# Patient Record
Sex: FEMALE | Race: WHITE | NOT HISPANIC OR LATINO | Employment: UNEMPLOYED | URBAN - NONMETROPOLITAN AREA
[De-identification: names, ages, dates, MRNs, and addresses within clinical notes are randomized per-mention and may not be internally consistent; named-entity substitution may affect disease eponyms.]

---

## 2021-07-06 ENCOUNTER — HOSPITAL ENCOUNTER (EMERGENCY)
Facility: HOSPITAL | Age: 36
Discharge: PSYCHIATRIC OR SUBSTANCE USE FACILITY | End: 2021-07-07
Attending: STUDENT IN AN ORGANIZED HEALTH CARE EDUCATION/TRAINING PROGRAM | Admitting: EMERGENCY MEDICINE

## 2021-07-06 ENCOUNTER — APPOINTMENT (OUTPATIENT)
Dept: GENERAL RADIOLOGY | Facility: HOSPITAL | Age: 36
End: 2021-07-06

## 2021-07-06 DIAGNOSIS — F10.10 ALCOHOL ABUSE: Primary | ICD-10-CM

## 2021-07-06 LAB
6-ACETYL MORPHINE: NEGATIVE
ALBUMIN SERPL-MCNC: 5 G/DL (ref 3.5–5.2)
ALBUMIN/GLOB SERPL: 1.6 G/DL
ALP SERPL-CCNC: 122 U/L (ref 39–117)
ALT SERPL W P-5'-P-CCNC: 12 U/L (ref 1–33)
AMPHET+METHAMPHET UR QL: NEGATIVE
ANION GAP SERPL CALCULATED.3IONS-SCNC: 23.5 MMOL/L (ref 5–15)
AST SERPL-CCNC: 39 U/L (ref 1–32)
B-HCG UR QL: NEGATIVE
BACTERIA UR QL AUTO: ABNORMAL /HPF
BARBITURATES UR QL SCN: NEGATIVE
BASOPHILS # BLD AUTO: 0.09 10*3/MM3 (ref 0–0.2)
BASOPHILS NFR BLD AUTO: 1.2 % (ref 0–1.5)
BENZODIAZ UR QL SCN: NEGATIVE
BILIRUB SERPL-MCNC: 0.5 MG/DL (ref 0–1.2)
BILIRUB UR QL STRIP: NEGATIVE
BUN SERPL-MCNC: 12 MG/DL (ref 6–20)
BUN/CREAT SERPL: 13 (ref 7–25)
BUPRENORPHINE SERPL-MCNC: NEGATIVE NG/ML
C TRACH RRNA CVX QL NAA+PROBE: NOT DETECTED
CALCIUM SPEC-SCNC: 9.1 MG/DL (ref 8.6–10.5)
CANNABINOIDS SERPL QL: NEGATIVE
CHLORIDE SERPL-SCNC: 96 MMOL/L (ref 98–107)
CLARITY UR: ABNORMAL
CO2 SERPL-SCNC: 20.5 MMOL/L (ref 22–29)
COCAINE UR QL: NEGATIVE
COLOR UR: YELLOW
CREAT SERPL-MCNC: 0.92 MG/DL (ref 0.57–1)
CRP SERPL-MCNC: <0.3 MG/DL (ref 0–0.5)
D-LACTATE SERPL-SCNC: 3.4 MMOL/L (ref 0.5–2)
D-LACTATE SERPL-SCNC: 5 MMOL/L (ref 0.5–2)
DEPRECATED RDW RBC AUTO: 46.8 FL (ref 37–54)
EOSINOPHIL # BLD AUTO: 0.52 10*3/MM3 (ref 0–0.4)
EOSINOPHIL NFR BLD AUTO: 7 % (ref 0.3–6.2)
ERYTHROCYTE [DISTWIDTH] IN BLOOD BY AUTOMATED COUNT: 15.2 % (ref 12.3–15.4)
ETHANOL BLD-MCNC: 135 MG/DL (ref 0–10)
ETHANOL BLD-MCNC: 223 MG/DL (ref 0–10)
ETHANOL UR QL: 0.14 %
ETHANOL UR QL: 0.22 %
FLUAV RNA RESP QL NAA+PROBE: NOT DETECTED
FLUBV RNA RESP QL NAA+PROBE: NOT DETECTED
GFR SERPL CREATININE-BSD FRML MDRD: 70 ML/MIN/1.73
GLOBULIN UR ELPH-MCNC: 3.2 GM/DL
GLUCOSE SERPL-MCNC: 87 MG/DL (ref 65–99)
GLUCOSE UR STRIP-MCNC: NEGATIVE MG/DL
HCT VFR BLD AUTO: 40.2 % (ref 34–46.6)
HGB BLD-MCNC: 13.2 G/DL (ref 12–15.9)
HGB UR QL STRIP.AUTO: ABNORMAL
HOLD SPECIMEN: NORMAL
HOLD SPECIMEN: NORMAL
HYALINE CASTS UR QL AUTO: ABNORMAL /LPF
IMM GRANULOCYTES # BLD AUTO: 0.01 10*3/MM3 (ref 0–0.05)
IMM GRANULOCYTES NFR BLD AUTO: 0.1 % (ref 0–0.5)
KETONES UR QL STRIP: ABNORMAL
LEUKOCYTE ESTERASE UR QL STRIP.AUTO: ABNORMAL
LYMPHOCYTES # BLD AUTO: 1.87 10*3/MM3 (ref 0.7–3.1)
LYMPHOCYTES NFR BLD AUTO: 25.2 % (ref 19.6–45.3)
MAGNESIUM SERPL-MCNC: 2.1 MG/DL (ref 1.6–2.6)
MCH RBC QN AUTO: 27.6 PG (ref 26.6–33)
MCHC RBC AUTO-ENTMCNC: 32.8 G/DL (ref 31.5–35.7)
MCV RBC AUTO: 84.1 FL (ref 79–97)
METHADONE UR QL SCN: NEGATIVE
MONOCYTES # BLD AUTO: 0.42 10*3/MM3 (ref 0.1–0.9)
MONOCYTES NFR BLD AUTO: 5.7 % (ref 5–12)
N GONORRHOEA RRNA SPEC QL NAA+PROBE: NOT DETECTED
NEUTROPHILS NFR BLD AUTO: 4.51 10*3/MM3 (ref 1.7–7)
NEUTROPHILS NFR BLD AUTO: 60.8 % (ref 42.7–76)
NITRITE UR QL STRIP: NEGATIVE
NRBC BLD AUTO-RTO: 0 /100 WBC (ref 0–0.2)
OPIATES UR QL: NEGATIVE
OXYCODONE UR QL SCN: NEGATIVE
PCP UR QL SCN: NEGATIVE
PH UR STRIP.AUTO: <=5 [PH] (ref 5–8)
PLATELET # BLD AUTO: 273 10*3/MM3 (ref 140–450)
PMV BLD AUTO: 9.6 FL (ref 6–12)
POTASSIUM SERPL-SCNC: 4.2 MMOL/L (ref 3.5–5.2)
PROT SERPL-MCNC: 8.2 G/DL (ref 6–8.5)
PROT UR QL STRIP: ABNORMAL
RBC # BLD AUTO: 4.78 10*6/MM3 (ref 3.77–5.28)
RBC # UR: ABNORMAL /HPF
REF LAB TEST METHOD: ABNORMAL
SARS-COV-2 RNA RESP QL NAA+PROBE: NOT DETECTED
SODIUM SERPL-SCNC: 140 MMOL/L (ref 136–145)
SP GR UR STRIP: 1.02 (ref 1–1.03)
SQUAMOUS #/AREA URNS HPF: ABNORMAL /HPF
UROBILINOGEN UR QL STRIP: ABNORMAL
WBC # BLD AUTO: 7.42 10*3/MM3 (ref 3.4–10.8)
WBC UR QL AUTO: ABNORMAL /HPF
WHOLE BLOOD HOLD SPECIMEN: NORMAL

## 2021-07-06 PROCEDURE — 87636 SARSCOV2 & INF A&B AMP PRB: CPT | Performed by: PHYSICIAN ASSISTANT

## 2021-07-06 PROCEDURE — 99285 EMERGENCY DEPT VISIT HI MDM: CPT

## 2021-07-06 PROCEDURE — 63710000001 ONDANSETRON ODT 4 MG TABLET DISPERSIBLE: Performed by: PHYSICIAN ASSISTANT

## 2021-07-06 PROCEDURE — 80307 DRUG TEST PRSMV CHEM ANLYZR: CPT | Performed by: PHYSICIAN ASSISTANT

## 2021-07-06 PROCEDURE — 96375 TX/PRO/DX INJ NEW DRUG ADDON: CPT

## 2021-07-06 PROCEDURE — 82077 ASSAY SPEC XCP UR&BREATH IA: CPT | Performed by: GENERAL ACUTE CARE HOSPITAL

## 2021-07-06 PROCEDURE — 83735 ASSAY OF MAGNESIUM: CPT | Performed by: PHYSICIAN ASSISTANT

## 2021-07-06 PROCEDURE — 86140 C-REACTIVE PROTEIN: CPT | Performed by: PHYSICIAN ASSISTANT

## 2021-07-06 PROCEDURE — 85025 COMPLETE CBC W/AUTO DIFF WBC: CPT | Performed by: PHYSICIAN ASSISTANT

## 2021-07-06 PROCEDURE — 87491 CHLMYD TRACH DNA AMP PROBE: CPT | Performed by: PHYSICIAN ASSISTANT

## 2021-07-06 PROCEDURE — 81025 URINE PREGNANCY TEST: CPT | Performed by: PHYSICIAN ASSISTANT

## 2021-07-06 PROCEDURE — 71045 X-RAY EXAM CHEST 1 VIEW: CPT | Performed by: RADIOLOGY

## 2021-07-06 PROCEDURE — 36415 COLL VENOUS BLD VENIPUNCTURE: CPT

## 2021-07-06 PROCEDURE — 87591 N.GONORRHOEAE DNA AMP PROB: CPT | Performed by: PHYSICIAN ASSISTANT

## 2021-07-06 PROCEDURE — 71045 X-RAY EXAM CHEST 1 VIEW: CPT

## 2021-07-06 PROCEDURE — 96368 THER/DIAG CONCURRENT INF: CPT

## 2021-07-06 PROCEDURE — 81001 URINALYSIS AUTO W/SCOPE: CPT | Performed by: PHYSICIAN ASSISTANT

## 2021-07-06 PROCEDURE — C9803 HOPD COVID-19 SPEC COLLECT: HCPCS | Performed by: PHYSICIAN ASSISTANT

## 2021-07-06 PROCEDURE — 87086 URINE CULTURE/COLONY COUNT: CPT | Performed by: PHYSICIAN ASSISTANT

## 2021-07-06 PROCEDURE — 82077 ASSAY SPEC XCP UR&BREATH IA: CPT | Performed by: PHYSICIAN ASSISTANT

## 2021-07-06 PROCEDURE — 96365 THER/PROPH/DIAG IV INF INIT: CPT

## 2021-07-06 PROCEDURE — 25010000002 CEFTRIAXONE PER 250 MG: Performed by: PHYSICIAN ASSISTANT

## 2021-07-06 PROCEDURE — 87040 BLOOD CULTURE FOR BACTERIA: CPT | Performed by: PHYSICIAN ASSISTANT

## 2021-07-06 PROCEDURE — 80053 COMPREHEN METABOLIC PANEL: CPT | Performed by: PHYSICIAN ASSISTANT

## 2021-07-06 PROCEDURE — 25010000002 DIAZEPAM PER 5 MG: Performed by: GENERAL ACUTE CARE HOSPITAL

## 2021-07-06 PROCEDURE — 25010000002 THIAMINE PER 100 MG: Performed by: PHYSICIAN ASSISTANT

## 2021-07-06 PROCEDURE — 83605 ASSAY OF LACTIC ACID: CPT | Performed by: PHYSICIAN ASSISTANT

## 2021-07-06 RX ORDER — IBUPROFEN 600 MG/1
600 TABLET ORAL ONCE
Status: COMPLETED | OUTPATIENT
Start: 2021-07-06 | End: 2021-07-06

## 2021-07-06 RX ORDER — DIAZEPAM 5 MG/ML
5 INJECTION, SOLUTION INTRAMUSCULAR; INTRAVENOUS ONCE
Status: COMPLETED | OUTPATIENT
Start: 2021-07-06 | End: 2021-07-06

## 2021-07-06 RX ORDER — SODIUM CHLORIDE 0.9 % (FLUSH) 0.9 %
10 SYRINGE (ML) INJECTION AS NEEDED
Status: DISCONTINUED | OUTPATIENT
Start: 2021-07-06 | End: 2021-07-07 | Stop reason: HOSPADM

## 2021-07-06 RX ORDER — CLONIDINE HYDROCHLORIDE 0.1 MG/1
0.1 TABLET ORAL ONCE
Status: COMPLETED | OUTPATIENT
Start: 2021-07-06 | End: 2021-07-06

## 2021-07-06 RX ORDER — ONDANSETRON 4 MG/1
4 TABLET, ORALLY DISINTEGRATING ORAL ONCE
Status: COMPLETED | OUTPATIENT
Start: 2021-07-06 | End: 2021-07-06

## 2021-07-06 RX ORDER — PHENAZOPYRIDINE HYDROCHLORIDE 200 MG/1
200 TABLET, FILM COATED ORAL ONCE
Status: COMPLETED | OUTPATIENT
Start: 2021-07-06 | End: 2021-07-06

## 2021-07-06 RX ORDER — LORAZEPAM 2 MG/1
2 TABLET ORAL ONCE
Status: COMPLETED | OUTPATIENT
Start: 2021-07-06 | End: 2021-07-06

## 2021-07-06 RX ADMIN — ONDANSETRON 4 MG: 4 TABLET, ORALLY DISINTEGRATING ORAL at 17:47

## 2021-07-06 RX ADMIN — DIAZEPAM 5 MG: 5 INJECTION, SOLUTION INTRAMUSCULAR; INTRAVENOUS at 23:56

## 2021-07-06 RX ADMIN — SODIUM CHLORIDE 1000 ML: 9 INJECTION, SOLUTION INTRAVENOUS at 19:05

## 2021-07-06 RX ADMIN — SODIUM CHLORIDE 2 G: 900 INJECTION INTRAVENOUS at 19:05

## 2021-07-06 RX ADMIN — LORAZEPAM 2 MG: 2 TABLET ORAL at 17:47

## 2021-07-06 RX ADMIN — CLONIDINE HYDROCHLORIDE 0.1 MG: 0.1 TABLET ORAL at 17:47

## 2021-07-06 RX ADMIN — IBUPROFEN 600 MG: 600 TABLET, FILM COATED ORAL at 17:47

## 2021-07-06 RX ADMIN — PHENAZOPYRIDINE HYDROCHLORIDE 200 MG: 200 TABLET ORAL at 19:05

## 2021-07-06 RX ADMIN — SODIUM CHLORIDE 1000 ML: 9 INJECTION, SOLUTION INTRAVENOUS at 22:18

## 2021-07-06 RX ADMIN — THIAMINE HYDROCHLORIDE 1000 ML/HR: 100 INJECTION, SOLUTION INTRAMUSCULAR; INTRAVENOUS at 20:05
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
## 2021-07-06 NOTE — NURSING NOTE
Pt arrived to intake, searched with two staff members present, pt's belongings placed in safe storage, safety precautions in place, pt advised to wear a mask and remain 6 ft from others.     Pt advises she drank Vodka today, possibly a whole bottle.  Will wait on ETOH level before performing an assessment.    ED provider notified of abnormal vitals, new orders noted, medications given

## 2021-07-07 ENCOUNTER — HOSPITAL ENCOUNTER (INPATIENT)
Facility: HOSPITAL | Age: 36
LOS: 1 days | Discharge: HOME | End: 2021-07-08
Attending: PSYCHIATRY & NEUROLOGY | Admitting: PSYCHIATRY & NEUROLOGY

## 2021-07-07 VITALS
DIASTOLIC BLOOD PRESSURE: 86 MMHG | TEMPERATURE: 98.7 F | HEART RATE: 80 BPM | BODY MASS INDEX: 22.73 KG/M2 | RESPIRATION RATE: 18 BRPM | WEIGHT: 150 LBS | SYSTOLIC BLOOD PRESSURE: 128 MMHG | HEIGHT: 68 IN | OXYGEN SATURATION: 95 %

## 2021-07-07 PROBLEM — F10.20 ETOH DEPENDENCE (HCC): Status: ACTIVE | Noted: 2021-07-07

## 2021-07-07 LAB
D-LACTATE SERPL-SCNC: 0.8 MMOL/L (ref 0.5–2)
D-LACTATE SERPL-SCNC: 2.8 MMOL/L (ref 0.5–2)
ETHANOL BLD-MCNC: 69 MG/DL (ref 0–10)
ETHANOL UR QL: 0.07 %

## 2021-07-07 PROCEDURE — 83605 ASSAY OF LACTIC ACID: CPT | Performed by: GENERAL ACUTE CARE HOSPITAL

## 2021-07-07 PROCEDURE — 99223 1ST HOSP IP/OBS HIGH 75: CPT | Performed by: PSYCHIATRY & NEUROLOGY

## 2021-07-07 PROCEDURE — 93010 ELECTROCARDIOGRAM REPORT: CPT | Performed by: INTERNAL MEDICINE

## 2021-07-07 PROCEDURE — 93005 ELECTROCARDIOGRAM TRACING: CPT | Performed by: PSYCHIATRY & NEUROLOGY

## 2021-07-07 PROCEDURE — 82077 ASSAY SPEC XCP UR&BREATH IA: CPT | Performed by: GENERAL ACUTE CARE HOSPITAL

## 2021-07-07 PROCEDURE — HZ2ZZZZ DETOXIFICATION SERVICES FOR SUBSTANCE ABUSE TREATMENT: ICD-10-PCS | Performed by: PSYCHIATRY & NEUROLOGY

## 2021-07-07 RX ORDER — FAMOTIDINE 20 MG/1
20 TABLET, FILM COATED ORAL 2 TIMES DAILY PRN
Status: DISCONTINUED | OUTPATIENT
Start: 2021-07-07 | End: 2021-07-08 | Stop reason: HOSPADM

## 2021-07-07 RX ORDER — LORAZEPAM 0.5 MG/1
0.5 TABLET ORAL
Status: DISCONTINUED | OUTPATIENT
Start: 2021-07-11 | End: 2021-07-08 | Stop reason: HOSPADM

## 2021-07-07 RX ORDER — MULTIVITAMIN WITH IRON
2 TABLET ORAL DAILY
Status: DISCONTINUED | OUTPATIENT
Start: 2021-07-07 | End: 2021-07-08 | Stop reason: HOSPADM

## 2021-07-07 RX ORDER — QUETIAPINE FUMARATE 300 MG/1
250 TABLET, FILM COATED ORAL NIGHTLY
COMMUNITY
End: 2022-06-15 | Stop reason: SDUPTHER

## 2021-07-07 RX ORDER — LABETALOL 200 MG/1
200 TABLET, FILM COATED ORAL EVERY 12 HOURS SCHEDULED
Status: CANCELLED | OUTPATIENT
Start: 2021-07-07

## 2021-07-07 RX ORDER — ATOMOXETINE 40 MG/1
40 CAPSULE ORAL DAILY
Status: DISCONTINUED | OUTPATIENT
Start: 2021-07-07 | End: 2021-07-07

## 2021-07-07 RX ORDER — BENZTROPINE MESYLATE 1 MG/ML
1 INJECTION INTRAMUSCULAR; INTRAVENOUS ONCE AS NEEDED
Status: DISCONTINUED | OUTPATIENT
Start: 2021-07-07 | End: 2021-07-08 | Stop reason: HOSPADM

## 2021-07-07 RX ORDER — LOPERAMIDE HYDROCHLORIDE 2 MG/1
2 CAPSULE ORAL
Status: DISCONTINUED | OUTPATIENT
Start: 2021-07-07 | End: 2021-07-08 | Stop reason: HOSPADM

## 2021-07-07 RX ORDER — ONDANSETRON 4 MG/1
4 TABLET, FILM COATED ORAL EVERY 6 HOURS PRN
Status: DISCONTINUED | OUTPATIENT
Start: 2021-07-07 | End: 2021-07-08 | Stop reason: HOSPADM

## 2021-07-07 RX ORDER — MULTIPLE VITAMINS W/ MINERALS TAB 9MG-400MCG
1 TAB ORAL DAILY
Status: DISCONTINUED | OUTPATIENT
Start: 2021-07-07 | End: 2021-07-08 | Stop reason: HOSPADM

## 2021-07-07 RX ORDER — IBUPROFEN 400 MG/1
400 TABLET ORAL EVERY 6 HOURS PRN
Status: DISCONTINUED | OUTPATIENT
Start: 2021-07-07 | End: 2021-07-08 | Stop reason: HOSPADM

## 2021-07-07 RX ORDER — LORAZEPAM 2 MG/1
2 TABLET ORAL EVERY 4 HOURS PRN
Status: DISCONTINUED | OUTPATIENT
Start: 2021-07-08 | End: 2021-07-08 | Stop reason: HOSPADM

## 2021-07-07 RX ORDER — LEVOTHYROXINE SODIUM 0.03 MG/1
25 TABLET ORAL
Status: DISCONTINUED | OUTPATIENT
Start: 2021-07-07 | End: 2021-07-08 | Stop reason: HOSPADM

## 2021-07-07 RX ORDER — HYDROXYZINE 50 MG/1
50 TABLET, FILM COATED ORAL EVERY 6 HOURS PRN
Status: DISCONTINUED | OUTPATIENT
Start: 2021-07-07 | End: 2021-07-08 | Stop reason: HOSPADM

## 2021-07-07 RX ORDER — ECHINACEA PURPUREA EXTRACT 125 MG
2 TABLET ORAL AS NEEDED
Status: DISCONTINUED | OUTPATIENT
Start: 2021-07-07 | End: 2021-07-08 | Stop reason: HOSPADM

## 2021-07-07 RX ORDER — QUETIAPINE FUMARATE 100 MG/1
300 TABLET, FILM COATED ORAL NIGHTLY
Status: DISCONTINUED | OUTPATIENT
Start: 2021-07-07 | End: 2021-07-08 | Stop reason: HOSPADM

## 2021-07-07 RX ORDER — ATOMOXETINE 40 MG/1
40 CAPSULE ORAL DAILY
Status: CANCELLED | OUTPATIENT
Start: 2021-07-07

## 2021-07-07 RX ORDER — BENZTROPINE MESYLATE 1 MG/1
2 TABLET ORAL ONCE AS NEEDED
Status: DISCONTINUED | OUTPATIENT
Start: 2021-07-07 | End: 2021-07-08 | Stop reason: HOSPADM

## 2021-07-07 RX ORDER — LORAZEPAM 1 MG/1
1 TABLET ORAL
Status: DISCONTINUED | OUTPATIENT
Start: 2021-07-10 | End: 2021-07-08 | Stop reason: HOSPADM

## 2021-07-07 RX ORDER — ATOMOXETINE 40 MG/1
40 CAPSULE ORAL DAILY
COMMUNITY
End: 2022-06-15

## 2021-07-07 RX ORDER — ALUMINA, MAGNESIA, AND SIMETHICONE 2400; 2400; 240 MG/30ML; MG/30ML; MG/30ML
15 SUSPENSION ORAL EVERY 6 HOURS PRN
Status: DISCONTINUED | OUTPATIENT
Start: 2021-07-07 | End: 2021-07-08 | Stop reason: HOSPADM

## 2021-07-07 RX ORDER — LORAZEPAM 2 MG/1
2 TABLET ORAL
Status: DISPENSED | OUTPATIENT
Start: 2021-07-07 | End: 2021-07-08

## 2021-07-07 RX ORDER — LABETALOL 200 MG/1
200 TABLET, FILM COATED ORAL 2 TIMES DAILY
COMMUNITY
End: 2022-06-15

## 2021-07-07 RX ORDER — LORAZEPAM 1 MG/1
1 TABLET ORAL EVERY 4 HOURS PRN
Status: DISCONTINUED | OUTPATIENT
Start: 2021-07-10 | End: 2021-07-08 | Stop reason: HOSPADM

## 2021-07-07 RX ORDER — LORAZEPAM 2 MG/1
2 TABLET ORAL
Status: DISCONTINUED | OUTPATIENT
Start: 2021-07-08 | End: 2021-07-08 | Stop reason: HOSPADM

## 2021-07-07 RX ORDER — LEVOTHYROXINE SODIUM 0.03 MG/1
25 TABLET ORAL DAILY
COMMUNITY
End: 2022-06-15 | Stop reason: SDUPTHER

## 2021-07-07 RX ORDER — LEVOTHYROXINE SODIUM 0.03 MG/1
25 TABLET ORAL DAILY
Status: CANCELLED | OUTPATIENT
Start: 2021-07-07

## 2021-07-07 RX ORDER — LORAZEPAM 0.5 MG/1
0.5 TABLET ORAL EVERY 4 HOURS PRN
Status: DISCONTINUED | OUTPATIENT
Start: 2021-07-11 | End: 2021-07-08 | Stop reason: HOSPADM

## 2021-07-07 RX ORDER — QUETIAPINE FUMARATE 100 MG/1
300 TABLET, FILM COATED ORAL NIGHTLY
Status: CANCELLED | OUTPATIENT
Start: 2021-07-07

## 2021-07-07 RX ORDER — BENZONATATE 100 MG/1
100 CAPSULE ORAL 3 TIMES DAILY PRN
Status: DISCONTINUED | OUTPATIENT
Start: 2021-07-07 | End: 2021-07-08 | Stop reason: HOSPADM

## 2021-07-07 RX ORDER — LABETALOL 200 MG/1
200 TABLET, FILM COATED ORAL EVERY 12 HOURS SCHEDULED
Status: DISCONTINUED | OUTPATIENT
Start: 2021-07-07 | End: 2021-07-08 | Stop reason: HOSPADM

## 2021-07-07 RX ORDER — TRAZODONE HYDROCHLORIDE 50 MG/1
50 TABLET ORAL NIGHTLY PRN
Status: DISCONTINUED | OUTPATIENT
Start: 2021-07-07 | End: 2021-07-08 | Stop reason: HOSPADM

## 2021-07-07 RX ORDER — ACETAMINOPHEN 325 MG/1
650 TABLET ORAL EVERY 6 HOURS PRN
Status: CANCELLED | OUTPATIENT
Start: 2021-07-07

## 2021-07-07 RX ADMIN — Medication 2 TABLET: at 08:44

## 2021-07-07 RX ADMIN — LABETALOL HYDROCHLORIDE 200 MG: 200 TABLET, FILM COATED ORAL at 14:57

## 2021-07-07 RX ADMIN — Medication 100 MG: at 08:44

## 2021-07-07 RX ADMIN — LORAZEPAM 2 MG: 2 TABLET ORAL at 23:41

## 2021-07-07 RX ADMIN — LABETALOL HYDROCHLORIDE 200 MG: 200 TABLET, FILM COATED ORAL at 20:44

## 2021-07-07 RX ADMIN — LEVOTHYROXINE SODIUM 25 MCG: 25 TABLET ORAL at 14:57

## 2021-07-07 RX ADMIN — IBUPROFEN 400 MG: 400 TABLET, FILM COATED ORAL at 11:59

## 2021-07-07 RX ADMIN — LORAZEPAM 2 MG: 2 TABLET ORAL at 08:44

## 2021-07-07 RX ADMIN — QUETIAPINE FUMARATE 300 MG: 100 TABLET ORAL at 20:44

## 2021-07-07 RX ADMIN — HYDROXYZINE HYDROCHLORIDE 50 MG: 50 TABLET ORAL at 23:41

## 2021-07-07 RX ADMIN — LORAZEPAM 2 MG: 2 TABLET ORAL at 11:10

## 2021-07-07 RX ADMIN — LORAZEPAM 2 MG: 2 TABLET ORAL at 20:44

## 2021-07-07 RX ADMIN — HYDROXYZINE HYDROCHLORIDE 50 MG: 50 TABLET ORAL at 12:13

## 2021-07-07 RX ADMIN — LORAZEPAM 2 MG: 2 TABLET ORAL at 05:24

## 2021-07-07 RX ADMIN — LORAZEPAM 2 MG: 2 TABLET ORAL at 13:56

## 2021-07-07 RX ADMIN — Medication 1 TABLET: at 09:30

## 2021-07-07 NOTE — PLAN OF CARE
Problem: Adult Behavioral Health Plan of Care  Goal: Plan of Care Review  Outcome: Ongoing, Progressing  Flowsheets  Taken 7/7/2021 1820  Plan of Care Reviewed With: patient  Patient Agreement with Plan of Care: agrees  Outcome Summary: client rated anxiety 8 and depresson 8, denies craving. sleeping and eating okay. continually seeked staff early in the day but improved throughout the day.  Taken 7/7/2021 0903  Plan of Care Reviewed With: patient  Patient Agreement with Plan of Care: agrees   Goal Outcome Evaluation:  Plan of Care Reviewed With: patient  Patient Agreement with Plan of Care: agrees        Outcome Summary: client rated anxiety 8 and depresson 8, denies craving. sleeping and eating okay. continually seeked staff early in the day but improved throughout the day.
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
## 2021-07-07 NOTE — NURSING NOTE
Presented pt to Dr. Cronin and all abnormal labs. New orders to admit patient to CD. Routine orders. SP4. Ativan detox protocol with comfort meds. [id number].

## 2021-07-07 NOTE — H&P
INITIAL PSYCHIATRIC HISTORY & PHYSICAL    Patient Identification:  Name:  Haroldo Urias  Age:  35 y.o.  Sex:  female  :  1985  MRN:  [id number]   Visit Number:  [id number]  Primary Care Physician:  Provider, No Known    SUBJECTIVE    CC/Focus of Exam: alcohol use    HPI: Haroldo Urias is a 35 y.o. female who was admitted on 2021 with complaints of alcohol use and withdrawals. The patient reports a long history of substance use. First use was in her high school years but didn't drink regularly until about five years ago when her sister was sick and was in ICU for months then she passed away and she drank to deal with the stress. Over time the use increased and the patient  continued to use despite negative consequences. The patient endorses symptoms of tolerance and withdrawals. Has tried to cut down and stop but has not been successful. Spends too much time and resources in pursuit of substance use. Longest period of sobriety is reported to be about 50 days..  Currently using a bottle of vodka ever day for last month.  Last use was about five days ago.  Withdrawal symptoms started second day of not drinking, has had nausea, vomiting, hallucinations, sweating, itching. Last hallucinations experience was yesterday in the ED when she saw spider on the wall.     PAST PSYCHIATRIC HX: Has been in treatment for depression, anxiety in dual diagnosis programs. She rates her depression 3/10 but she has had episodes of feeling very depressed when she starts drinking as she feels very bad.     SUBSTANCE USE HX: Alcohol use as described in HPI.    SOCIAL HX:   Social History     Socioeconomic History   • Marital status:      Spouse name: Not on file   • Number of children: Not on file   • Years of education: Not on file   • Highest education level: Not on file   Tobacco Use   • Smoking status: Former Smoker   • Smokeless tobacco: Never Used   Vaping Use   • Vaping Use: Every day   • Substances:  Nicotine   Substance and Sexual Activity   • Alcohol use: Yes     Comment: a fifth of vodka a day    • Drug use: Yes     Comment: ETOH   • Sexual activity: Yes     Partners: Male         Past Medical History:   Diagnosis Date   • Alcohol abuse    • Anxiety    • Depression    • Hypertension    • Substance abuse (CMS/HCC)    • Withdrawal symptoms, alcohol (CMS/HCC)         History reviewed. No pertinent surgical history.    Family History   Problem Relation Age of Onset   • ADD / ADHD Sister    • Anxiety disorder Sister    • Drug abuse Sister          Medications Prior to Admission   Medication Sig Dispense Refill Last Dose   • atomoxetine (STRATTERA) 40 MG capsule Take 40 mg by mouth Daily.   7/1/2021 at Unknown time   • labetalol (NORMODYNE) 200 MG tablet Take 200 mg by mouth 2 (Two) Times a Day.   7/1/2021 at Unknown time   • levothyroxine (SYNTHROID, LEVOTHROID) 25 MCG tablet Take 25 mcg by mouth Daily.   7/1/2021 at Unknown time   • QUEtiapine (SEROquel) 300 MG tablet Take 300 mg by mouth Every Night.   7/1/2021 at Unknown time         ALLERGIES:  Patient has no known allergies.    Temp:  [98 °F (36.7 °C)-98.7 °F (37.1 °C)] 98.1 °F (36.7 °C)  Heart Rate:  [] 77  Resp:  [16-18] 16  BP: (124-161)/() 154/98    REVIEW OF SYSTEMS:  Review of Systems   Constitutional: Positive for chills, diaphoresis and fatigue.   HENT: Negative.    Eyes: Negative.    Respiratory: Negative.    Cardiovascular: Negative.    Gastrointestinal: Negative.    Endocrine: Negative.    Genitourinary: Positive for dysuria.   Musculoskeletal: Negative.    Skin: Negative.    Allergic/Immunologic: Negative.    Neurological: Negative.    Hematological: Negative.    Psychiatric/Behavioral: Positive for dysphoric mood. The patient is nervous/anxious.         OBJECTIVE    PHYSICAL EXAM:  Physical Exam  Constitutional:  Appears well-developed and well-nourished.   HENT:   Head: Normocephalic and atraumatic.   Right Ear: External ear normal.    Left Ear: External ear normal.   Mouth/Throat: Oropharynx is clear and moist.   Eyes: Pupils are equal, round, and reactive to light. Conjunctivae and EOM are normal.   Neck: Normal range of motion. Neck supple.   Cardiovascular: Normal rate, regular rhythm and normal heart sounds.    Respiratory: Effort normal and breath sounds normal. No respiratory distress. No wheezes.   GI: Soft. Bowel sounds are normal.No distension. There is no tenderness.   Musculoskeletal: Normal range of motion. No edema or deformity.   Neurological:No cranial nerve deficit. Coordination normal.   Skin: Skin is warm and dry. No rash noted. No erythema.       MENTAL STATUS EXAM:   Hygiene:   fair  Cooperation:  Cooperative  Eye Contact:  Good  Psychomotor Behavior:  Appropriate  Affect:  Restricted  Hopelessness: 5  Speech:  Normal  Thought Progress:  Goal directed  Thought Content:  Normal  Suicidal:  None  Homicidal:  None  Hallucinations:  None  Delusion:  None  Memory:  Intact  Orientation:  Person, Place, Time and Situation  Reliability:  fair  Insight:  Fair  Judgement:  Fair  Impulse Control:  Fair      Imaging Results (Last 24 Hours)     ** No results found for the last 24 hours. **           ECG/EMG Results (most recent)     Procedure Component Value Units Date/Time    ECG 12 Lead [[id number]] Collected: 07/07/21 1655     Updated: 07/07/21 1656     QT Interval 426 ms      QTC Interval 469 ms     Narrative:      Test Reason : Baseline Cardiac Status  Blood Pressure :   */*   mmHG  Vent. Rate :  73 BPM     Atrial Rate :  73 BPM     P-R Int : 174 ms          QRS Dur :  88 ms      QT Int : 426 ms       P-R-T Axes :  70  73  54 degrees     QTc Int : 469 ms    Normal sinus rhythm  Possible Left atrial enlargement  Borderline ECG  No previous ECGs available    Referred By:            Confirmed By:            Lab Results   Component Value Date    GLUCOSE 87 07/06/2021    BUN 12 07/06/2021    CREATININE 0.92 07/06/2021    EGFRIFNONA  70 07/06/2021    BCR 13.0 07/06/2021    CO2 20.5 (L) 07/06/2021    CALCIUM 9.1 07/06/2021    ALBUMIN 5.00 07/06/2021    AST 39 (H) 07/06/2021    ALT 12 07/06/2021       Lab Results   Component Value Date    WBC 7.42 07/06/2021    HGB 13.2 07/06/2021    HCT 40.2 07/06/2021    MCV 84.1 07/06/2021     07/06/2021       Last Urine Toxicity     LAST URINE TOXICITY RESULTS Latest Ref Rng & Units 7/6/2021    AMPHETAMINES SCREEN, URINE Negative Negative    BARBITURATES SCREEN Negative Negative    BENZODIAZEPINE SCREEN, URINE Negative Negative    BUPRENORPHINEUR Negative Negative    COCAINE SCREEN, URINE Negative Negative    METHADONE SCREEN, URINE Negative Negative          Brief Urine Lab Results  (Last result in the past 365 days)      Color   Clarity   Blood   Leuk Est   Nitrite   Protein   CREAT   Urine HCG        07/06/21 1725 Yellow Cloudy Trace Trace Negative 30 mg/dL (1+)         07/06/21 1725               Negative           DATA  Labs reviewed.  CO2 20.5, chloride 96, anion gap 23.5.  Alkaline phosphatase 122.  AST 39.  CBC is within normal limits.  Analysis shows cloudy appearance, 2+ ketones, trace blood, trace leukocyte esterase, 1+ protein, 21-30 WBCs, 2+ bacteria, 21-30 squamous epithelial cells.  Blood alcohol level 223 mg/dL at the time of presentation.  Urine drug screen is negative.  EKG reviewed.  Pending  HIEU reviewed.  No controlled prescriptions noted.  Record reviewed.  No previous treatment of inpatient or outpatient psychiatric or substance abuse treatment in this hospital.    Strengths: Motivated for treatment    Weaknesses:Substance use, Poor coping skills and Personality issues    Code status: Full  Discussed code status with patient.    ASSESSMENT & PLAN:        Alcohol use disorder, severe, dependence (CMS/HCC)  - Ativan detox  - Thiamine and folate      Depression unspecifed  - Continue Seroquel      Hypertension  - Labetalol      Hypothyroidism  - Synthroid        The patient has  been admitted for safety and stabilization.  Patient will be monitored for suicidality daily and maintained on Special Precautions Level 4 (q30 min checks).  The patient will have individual and group therapy with a master's level therapist. A master treatment plan will be developed and agreed upon by the patient and his/her treatment team.  The patient's estimated length of stay in the hospital is 5-7 days.

## 2021-07-07 NOTE — PLAN OF CARE
Goal Outcome Evaluation:  Plan of Care Reviewed With: patient  Patient Agreement with Plan of Care: agrees     Progress: no change  Outcome Summary: Pt new admit. Pt drinking 1/5 vodka daily for 5 years.

## 2021-07-07 NOTE — NURSING NOTE
Patient presents requesting to the ED requesting ETOH detox. She reports drinking a fifth of vodka off and on for the last 5 years. She reports she relapsed and has been binge drinking for the past 5 days. She denies any other substance use. Patient reports she started drinking 5 years ago after her little sister passed away. She reports a hx of hallucinations while attempting to detox. She denies si, hi, and avh. She rates anxiety and depression 10/10. ciwa 16.

## 2021-07-08 VITALS
BODY MASS INDEX: 22.34 KG/M2 | HEART RATE: 98 BPM | OXYGEN SATURATION: 98 % | SYSTOLIC BLOOD PRESSURE: 153 MMHG | TEMPERATURE: 98.5 F | WEIGHT: 147.4 LBS | DIASTOLIC BLOOD PRESSURE: 107 MMHG | RESPIRATION RATE: 16 BRPM | HEIGHT: 68 IN

## 2021-07-08 LAB
BACTERIA SPEC AEROBE CULT: NORMAL
QT INTERVAL: 428 MS
QTC INTERVAL: 471 MS

## 2021-07-08 PROCEDURE — 99238 HOSP IP/OBS DSCHRG MGMT 30/<: CPT | Performed by: PSYCHIATRY & NEUROLOGY

## 2021-07-08 RX ADMIN — LORAZEPAM 2 MG: 2 TABLET ORAL at 01:09

## 2021-07-08 RX ADMIN — LEVOTHYROXINE SODIUM 25 MCG: 25 TABLET ORAL at 10:19

## 2021-07-08 RX ADMIN — Medication 100 MG: at 08:10

## 2021-07-08 RX ADMIN — Medication 2 TABLET: at 08:10

## 2021-07-08 RX ADMIN — Medication 1 TABLET: at 08:10

## 2021-07-08 RX ADMIN — LABETALOL HYDROCHLORIDE 200 MG: 200 TABLET, FILM COATED ORAL at 08:09

## 2021-07-08 RX ADMIN — LORAZEPAM 2 MG: 2 TABLET ORAL at 08:10

## 2021-07-08 NOTE — PLAN OF CARE
Problem: Adult Behavioral Health Plan of Care  Goal: Plan of Care Review  Outcome: Ongoing, Progressing  Goal: Patient-Specific Goal (Individualization)  Outcome: Ongoing, Progressing  Goal: Adheres to Safety Considerations for Self and Others  Outcome: Ongoing, Progressing  Intervention: Develop and Maintain Individualized Safety Plan  Description: Identify risk factors for violence to self and others at time of admission, times of risk elevation, and on discharge.  Obtain clinical history including suicidal, homicidal, combative, assaultive, or aggressive behavior.  Discuss safety concerns with patient; develop a corresponding safety plan.  Provide immediate and ongoing protective physical environment.  Conduct environment of care safety checks; monitor visitors and their possessions.  Be alert to warning signs of suicidal, homicidal, assaultive, or aggressive behavior  Note: Denial of suicidal ideation or agreement to a safety plan does not invalidate suicide risk.  Encourage frequent positive patient-staff interactions to promote verbalization of safety compromising ideations, thoughts or behaviors.  Goal: Absence of New-Onset Illness or Injury  Outcome: Ongoing, Progressing  Intervention: Identify and Manage Fall Risk  Description: Perform standard risk assessment on admission and reassess fall risk frequently, with change in status or transfer to another level of care.  Communicate fall injury risk to interprofessional healthcare team.  Determine need for increased observation, equipment and environmental modification.  Adjust safety measures to individual developmental age and stage and identified risk factors.  Reinforce the importance of safety and activity limitations to patient and family.  Perform regular intentional rounding to assess safety needs.  Goal: Optimized Coping Skills in Response to Life Stressors  Outcome: Ongoing, Progressing  Intervention: Promote Effective Coping  Strategies  Description: Identify current effective and ineffective coping strategies and strengths.  Assist with developing and use of positive coping strategies.  Utilize positive psychology techniques to enhance well-being.  Promote wellness through healthy lifestyle activities.  Consider complementary or alternative approaches.  Provide psychoeducation.  Goal: Develops/Participates in Therapeutic Colwell to Support Successful Transition  Outcome: Ongoing, Progressing  Intervention: Foster Therapeutic Colwell  Description: Establish rapport; develop trust relationship.  Provide a safe space for interaction; convey accept and and respect.  Normalize and validate patient experience; provide emotional support.  Identify and develop realistic, achievable recovery goals via shared decision-making.  Provide person and family-centered care; incorporate family/significant others in assessment and treatment planning.  Honor confidentiality.   Goal Outcome Evaluation:  Plan of Care Reviewed With: patient  Patient Agreement with Plan of Care: agrees        Outcome Summary: Pt has required multiple PAS doses of ativan this shift. BP remains elevated dispite PRN medications. Rates anxiety and depression both 10/10. Says she feels hopeless, helpless, worthless and guilty. Disoriented of date by 2 days. Noted to have increased confusion throughout the night. Pt was unable to answer questions appropriately starting around midnight. She was noted to progressively worsen and started hallucinating. She woke up and came to the nurses station multiple times unsure of where she was. Pt walked to the back door and said she had ordered door dash and thought her mother was outside with her food. Said she had talked to both her mother and  on the phone though pt had not used the phones.

## 2021-07-08 NOTE — DISCHARGE SUMMARY
":  1985  MRN:  [id number]  Visit Number:  [id number]      Date of Admission:2021   Date of Discharge:  2021    Discharge Diagnosis:  Active Problems:    Alcohol use disorder, severe, dependence (CMS/HCC)    Depression    Hypertension        Admission Diagnosis:  EtOH dependence (CMS/HCC) [F10.20]     SHANELL Urias is a 35 y.o. female who was admitted on 2021 with complaints of alcohol use and withdrawals.       Hospital Course  Patient is a 35 y.o. female presented with alcohol use and withdrawals. She was admitted to the detox recovery unit and started on Ativan detox. She was also continue on her home medications except Strattera. She reported feeling better in terms of her withdrawals the next day but felt very anxious because she didn't like being in a locked unit and it made her very anxious and she felt she could do better in a sober living setting. She reported her last alcohol use was six days prior and felt the withdrawals were behind her.      Mental Status Exam upon discharge:   Mood \"anxious\"   Affect-congruent, appropriate, stable  Thought Content-goal directed, no delusional material present  Thought process-linear, organized.  Suicidality: No SI  Homicidality: No HI  Perception: No AH/    Procedures Performed         Consults:   Consults     No orders found for last 30 day(s).          Pertinent Test Results:   Admission on 2021   Component Date Value Ref Range Status   • QT Interval 2021 428  ms Preliminary   • QTC Interval 2021 471  ms Preliminary   Admission on 2021, Discharged on 2021   Component Date Value Ref Range Status   • Glucose 2021 87  65 - 99 mg/dL Final   • BUN 2021 12  6 - 20 mg/dL Final   • Creatinine 2021 0.92  0.57 - 1.00 mg/dL Final   • Sodium 2021 140  136 - 145 mmol/L Final   • Potassium 2021 4.2  3.5 - 5.2 mmol/L Final    Slight hemolysis detected by analyzer. Results may be affected.   • " Chloride 07/06/2021 96* 98 - 107 mmol/L Final   • CO2 07/06/2021 20.5* 22.0 - 29.0 mmol/L Final   • Calcium 07/06/2021 9.1  8.6 - 10.5 mg/dL Final   • Total Protein 07/06/2021 8.2  6.0 - 8.5 g/dL Final   • Albumin 07/06/2021 5.00  3.50 - 5.20 g/dL Final   • ALT (SGPT) 07/06/2021 12  1 - 33 U/L Final   • AST (SGOT) 07/06/2021 39* 1 - 32 U/L Final   • Alkaline Phosphatase 07/06/2021 122* 39 - 117 U/L Final   • Total Bilirubin 07/06/2021 0.5  0.0 - 1.2 mg/dL Final   • eGFR Non  Amer 07/06/2021 70  >60 mL/min/1.73 Final   • Globulin 07/06/2021 3.2  gm/dL Final   • A/G Ratio 07/06/2021 1.6  g/dL Final   • BUN/Creatinine Ratio 07/06/2021 13.0  7.0 - 25.0 Final   • Anion Gap 07/06/2021 23.5* 5.0 - 15.0 mmol/L Final   • HCG, Urine QL 07/06/2021 Negative  Negative Final   • Color, UA 07/06/2021 Yellow  Yellow, Straw Final   • Appearance, UA 07/06/2021 Cloudy* Clear Final   • pH, UA 07/06/2021 <=5.0  5.0 - 8.0 Final   • Specific Gravity, UA 07/06/2021 1.021  1.005 - 1.030 Final   • Glucose, UA 07/06/2021 Negative  Negative Final   • Ketones, UA 07/06/2021 40 mg/dL (2+)* Negative Final   • Bilirubin, UA 07/06/2021 Negative  Negative Final   • Blood, UA 07/06/2021 Trace* Negative Final   • Protein, UA 07/06/2021 30 mg/dL (1+)* Negative Final   • Leuk Esterase, UA 07/06/2021 Trace* Negative Final   • Nitrite, UA 07/06/2021 Negative  Negative Final   • Urobilinogen, UA 07/06/2021 0.2 E.U./dL  0.2 - 1.0 E.U./dL Final   • Ethanol 07/06/2021 223* 0 - 10 mg/dL Final   • Ethanol % 07/06/2021 0.223  % Final   • Amphetamine Screen, Urine 07/06/2021 Negative  Negative Final   • Barbiturates Screen, Urine 07/06/2021 Negative  Negative Final   • Benzodiazepine Screen, Urine 07/06/2021 Negative  Negative Final   • Cocaine Screen, Urine 07/06/2021 Negative  Negative Final   • Methadone Screen, Urine 07/06/2021 Negative  Negative Final   • Opiate Screen 07/06/2021 Negative  Negative Final   • Phencyclidine (PCP), Urine 07/06/2021  Negative  Negative Final   • THC, Screen, Urine 07/06/2021 Negative  Negative Final   • 6-ACETYL MORPHINE 07/06/2021 Negative  Negative Final   • Buprenorphine, Screen, Urine 07/06/2021 Negative  Negative Final   • Oxycodone Screen, Urine 07/06/2021 Negative  Negative Final   • Magnesium 07/06/2021 2.1  1.6 - 2.6 mg/dL Final   • COVID19 07/06/2021 Not Detected  Not Detected - Ref. Range Final   • Influenza A PCR 07/06/2021 Not Detected  Not Detected Final   • Influenza B PCR 07/06/2021 Not Detected  Not Detected Final   • WBC 07/06/2021 7.42  3.40 - 10.80 10*3/mm3 Final   • RBC 07/06/2021 4.78  3.77 - 5.28 10*6/mm3 Final   • Hemoglobin 07/06/2021 13.2  12.0 - 15.9 g/dL Final   • Hematocrit 07/06/2021 40.2  34.0 - 46.6 % Final   • MCV 07/06/2021 84.1  79.0 - 97.0 fL Final   • MCH 07/06/2021 27.6  26.6 - 33.0 pg Final   • MCHC 07/06/2021 32.8  31.5 - 35.7 g/dL Final   • RDW 07/06/2021 15.2  12.3 - 15.4 % Final   • RDW-SD 07/06/2021 46.8  37.0 - 54.0 fl Final   • MPV 07/06/2021 9.6  6.0 - 12.0 fL Final   • Platelets 07/06/2021 273  140 - 450 10*3/mm3 Final   • Neutrophil % 07/06/2021 60.8  42.7 - 76.0 % Final   • Lymphocyte % 07/06/2021 25.2  19.6 - 45.3 % Final   • Monocyte % 07/06/2021 5.7  5.0 - 12.0 % Final   • Eosinophil % 07/06/2021 7.0* 0.3 - 6.2 % Final   • Basophil % 07/06/2021 1.2  0.0 - 1.5 % Final   • Immature Grans % 07/06/2021 0.1  0.0 - 0.5 % Final   • Neutrophils, Absolute 07/06/2021 4.51  1.70 - 7.00 10*3/mm3 Final   • Lymphocytes, Absolute 07/06/2021 1.87  0.70 - 3.10 10*3/mm3 Final   • Monocytes, Absolute 07/06/2021 0.42  0.10 - 0.90 10*3/mm3 Final   • Eosinophils, Absolute 07/06/2021 0.52* 0.00 - 0.40 10*3/mm3 Final   • Basophils, Absolute 07/06/2021 0.09  0.00 - 0.20 10*3/mm3 Final   • Immature Grans, Absolute 07/06/2021 0.01  0.00 - 0.05 10*3/mm3 Final   • nRBC 07/06/2021 0.0  0.0 - 0.2 /100 WBC Final   • Extra Tube 07/06/2021 Hold for add-ons.   Final    Auto resulted.   • Extra Tube 07/06/2021  hold for add-on   Final    Auto resulted   • Extra Tube 07/06/2021 Hold for add-ons.   Final    Auto resulted.   • Blood Culture 07/06/2021 No growth at 24 hours   Preliminary   • Blood Culture 07/06/2021 No growth at 24 hours   Preliminary   • Lactate 07/06/2021 5.0* 0.5 - 2.0 mmol/L Final   • C-Reactive Protein 07/06/2021 <0.30  0.00 - 0.50 mg/dL Final   • RBC, UA 07/06/2021 0-2  None Seen, 0-2 /HPF Final   • WBC, UA 07/06/2021 21-30* None Seen, 0-2 /HPF Final   • Bacteria, UA 07/06/2021 2+* None Seen /HPF Final   • Squamous Epithelial Cells, UA 07/06/2021 21-30* None Seen, 0-2 /HPF Final   • Hyaline Casts, UA 07/06/2021 7-12  None Seen /LPF Final   • Methodology 07/06/2021 Automated Microscopy   Final   • Chlamydia DNA by PCR 07/06/2021 Not Detected  Not Detected Final   • Neisseria gonorrhoeae by PCR 07/06/2021 Not Detected  Not Detected Final   • Lactate 07/06/2021 3.4* 0.5 - 2.0 mmol/L Final   • Urine Culture 07/06/2021 50,000 CFU/mL Mixed Elizabeth Isolated   Final   • Ethanol 07/06/2021 135* 0 - 10 mg/dL Final   • Ethanol % 07/06/2021 0.135  % Final   • Lactate 07/07/2021 2.8* 0.5 - 2.0 mmol/L Final   • Ethanol 07/07/2021 69* 0 - 10 mg/dL Final   • Ethanol % 07/07/2021 0.069  % Final   • Lactate 07/07/2021 0.8  0.5 - 2.0 mmol/L Final        Condition on Discharge:  improved    Vital Signs  Temp:  [97.1 °F (36.2 °C)-98.3 °F (36.8 °C)] 97.1 °F (36.2 °C)  Heart Rate:  [70-97] 97  Resp:  [16-18] 18  BP: (142-161)/() 154/111      Discharge Disposition:  Home or Self Care    Discharge Medications:     Discharge Medications      Continue These Medications      Instructions Start Date   atomoxetine 40 MG capsule  Commonly known as: STRATTERA   40 mg, Oral, Daily      labetalol 200 MG tablet  Commonly known as: NORMODYNE   200 mg, Oral, 2 Times Daily      levothyroxine 25 MCG tablet  Commonly known as: SYNTHROID, LEVOTHROID   25 mcg, Oral, Daily      QUEtiapine 300 MG tablet  Commonly known as: SEROquel   300 mg,  Oral, Nightly             Discharge Diet: Regular     Activity at Discharge: As tolerated     Follow-up Appointments    [facility] in [place] ([facility])    Time spent in discharge: < 30 min    Clinician:   Junaid White MD  07/08/21  13:06 EDT

## 2021-07-08 NOTE — PLAN OF CARE
Goal Outcome Evaluation:  Plan of Care Reviewed With: patient  Patient Agreement with Plan of Care: agrees

## 2021-07-08 NOTE — DISCHARGE INSTR - APPOINTMENTS
[place] [address]  [phone]  Tuesday, July 13 at 11:30  In-person appointment. Bring insurance card and ID with you.

## 2021-07-11 LAB
BACTERIA SPEC AEROBE CULT: NORMAL
BACTERIA SPEC AEROBE CULT: NORMAL

## 2022-06-15 ENCOUNTER — OFFICE VISIT (OUTPATIENT)
Dept: FAMILY MEDICINE CLINIC | Facility: CLINIC | Age: 37
End: 2022-06-15

## 2022-06-15 VITALS
BODY MASS INDEX: 24.83 KG/M2 | DIASTOLIC BLOOD PRESSURE: 76 MMHG | HEIGHT: 68 IN | SYSTOLIC BLOOD PRESSURE: 114 MMHG | HEART RATE: 68 BPM | OXYGEN SATURATION: 99 % | WEIGHT: 163.8 LBS

## 2022-06-15 DIAGNOSIS — I10 PRIMARY HYPERTENSION: ICD-10-CM

## 2022-06-15 DIAGNOSIS — F32.A DEPRESSION, UNSPECIFIED DEPRESSION TYPE: ICD-10-CM

## 2022-06-15 DIAGNOSIS — F10.20 ALCOHOL USE DISORDER, SEVERE, DEPENDENCE: ICD-10-CM

## 2022-06-15 DIAGNOSIS — E03.9 HYPOTHYROIDISM, UNSPECIFIED TYPE: Primary | ICD-10-CM

## 2022-06-15 PROCEDURE — 99204 OFFICE O/P NEW MOD 45 MIN: CPT | Performed by: INTERNAL MEDICINE

## 2022-06-15 RX ORDER — QUETIAPINE FUMARATE 200 MG/1
200 TABLET, FILM COATED ORAL NIGHTLY
Qty: 30 TABLET | Refills: 9 | Status: SHIPPED | OUTPATIENT
Start: 2022-06-15 | End: 2022-08-12 | Stop reason: SDUPTHER

## 2022-06-15 RX ORDER — LEVOTHYROXINE SODIUM 0.03 MG/1
25 TABLET ORAL DAILY
Qty: 30 TABLET | Refills: 9 | Status: SHIPPED | OUTPATIENT
Start: 2022-06-15 | End: 2022-08-12 | Stop reason: SDUPTHER

## 2022-06-15 RX ORDER — MIRTAZAPINE 30 MG/1
30 TABLET, FILM COATED ORAL NIGHTLY
Qty: 30 TABLET | Refills: 3 | Status: SHIPPED | OUTPATIENT
Start: 2022-06-15 | End: 2022-08-12 | Stop reason: SDUPTHER

## 2022-06-15 RX ORDER — BUPROPION HYDROCHLORIDE 300 MG/1
300 TABLET ORAL DAILY
Qty: 30 TABLET | Refills: 9 | Status: SHIPPED | OUTPATIENT
Start: 2022-06-15 | End: 2022-08-12 | Stop reason: SDUPTHER

## 2022-06-15 RX ORDER — BUPROPION HYDROCHLORIDE 100 MG/1
100 TABLET ORAL 2 TIMES DAILY
COMMUNITY
End: 2022-06-15

## 2022-06-15 RX ORDER — MIRTAZAPINE 30 MG/1
30 TABLET, FILM COATED ORAL NIGHTLY
COMMUNITY
End: 2022-06-15 | Stop reason: SDUPTHER

## 2022-06-15 RX ORDER — QUETIAPINE FUMARATE 50 MG/1
50 TABLET, FILM COATED ORAL NIGHTLY
Qty: 30 TABLET | Refills: 9 | Status: SHIPPED | OUTPATIENT
Start: 2022-06-15 | End: 2022-08-12 | Stop reason: SDUPTHER

## 2022-06-15 RX ORDER — METOPROLOL SUCCINATE 25 MG/1
25 TABLET, EXTENDED RELEASE ORAL DAILY
COMMUNITY
End: 2022-06-15 | Stop reason: SDUPTHER

## 2022-06-15 RX ORDER — BUPROPION HYDROCHLORIDE 100 MG/1
100 TABLET ORAL 2 TIMES DAILY
Status: CANCELLED | OUTPATIENT
Start: 2022-06-15

## 2022-06-15 RX ORDER — METOPROLOL SUCCINATE 25 MG/1
25 TABLET, EXTENDED RELEASE ORAL DAILY
Qty: 30 TABLET | Refills: 9 | Status: SHIPPED | OUTPATIENT
Start: 2022-06-15 | End: 2022-08-12 | Stop reason: SDUPTHER

## 2022-06-15 NOTE — ASSESSMENT & PLAN NOTE
Patient's depression is recurrent and is moderate without psychosis. Their depression is currently in partial remission and the condition is improving with treatment. This will be reassessed at the next regular appointment. F/U as described:patient will continue current medication therapy.  Have recommended and will increase Wellbutrin to 300 mg XL daily.  Continue Seroquel at bedtime and Remeron for sleep.

## 2022-06-15 NOTE — ASSESSMENT & PLAN NOTE
Hypertension is improving with treatment.  Continue current treatment regimen.  Dietary sodium restriction.  Weight loss.  Regular aerobic exercise.  I suspect a lot of this may be alcohol related.  I am hoping we will be able to taper off metoprolol as the longer she is in sobriety.  Blood pressure will be reassessed at the next regular appointment.

## 2022-06-15 NOTE — ASSESSMENT & PLAN NOTE
She is currently sober living after doing multiple 30-day courses rehab.  She has been abstinent x35 days.  She seems to be working a 12-step program and is looking for a sponsor.

## 2022-06-15 NOTE — ASSESSMENT & PLAN NOTE
Fortunately she has been without her medication for approximately 4-week.  Recommended TSH and resume her current dose at present.  We need may need to make further adjustments.

## 2022-06-15 NOTE — PROGRESS NOTES
Haroldo Bach  1985  [id number]  Patient Care Team:  Westley Hill MD as PCP - General (Internal Medicine)    Haroldo Bach is a 36 y.o. female here today to establish care.  This patient is accompanied by their self who contributes to the history of their care.    Chief Complaint:    Chief Complaint   Patient presents with   • Establish Care   • Abdominal Pain   • Weight Gain     In the last three weeks         History of Present Illness:     Recently returned from rehab [facility] for alcohol dependeny. Started at Age 19. Has been in rehab 4x in last year. Currently at day 35. Currently living at sober living. Originally from [facility]. Has been dx with hypertension. Had been on labetolol while trying to get pregnant. Currently on metoprolol. Was diagnosed with hypothyroidism, on 25 Mcg; however has been out of medication x 4 weeks. There as been some lower abdominal pain cramping in nature ( Due for menses next week). Denies n/v/d//f/c    Has gained weight gain  Over past several days. Abd cramping and weight have improved.  She does have underlying hypothyroidism and has been out of her medications for 4 weeks.  There has been some edema.    Is on wellbutrin, started at rehab.  Would like to increase her dose.  Is having extreme difficulty sleeping.  She is currently on Seroquel 200 mg plus an additional 50 mg at bedtime.  She is also on Remeron.  Denies any homicidal suicidal ideation.    Past Medical History:   Diagnosis Date   • Alcohol abuse    • Anxiety    • Depression    • Hypertension    • Substance abuse (HCC)    • Withdrawal symptoms, alcohol (HCC)        History reviewed. No pertinent surgical history.     Family History   Problem Relation Age of Onset   • Hypertension Father    • ADD / ADHD Sister    • Anxiety disorder Sister    • Drug abuse Sister    • Anxiety disorder Maternal Grandfather        Social History     Socioeconomic History   • Marital status:    Tobacco Use   • Smoking  "status: Former Smoker     Packs/day: 0.25     Years: 17.00     Pack years: 4.25     Types: Cigarettes     Start date: 2004     Quit date: 2021     Years since quittin.4   • Smokeless tobacco: Never Used   Vaping Use   • Vaping Use: Every day   • Substances: Nicotine   Substance and Sexual Activity   • Alcohol use: Not Currently     Comment: a fifth of vodka a day    • Drug use: Yes     Comment: ETOH, sober x 35 days   • Sexual activity: Yes     Partners: Male       No Known Allergies    Review of Systems:    Review of Systems   Constitutional: Positive for unexpected weight gain.   HENT: Negative.    Eyes: Negative.    Respiratory: Negative.    Gastrointestinal: Positive for abdominal pain.   Endocrine: Negative.    Genitourinary: Negative.    Musculoskeletal: Negative.    Neurological: Negative.    Hematological: Negative.    Psychiatric/Behavioral: Positive for sleep disturbance. The patient is nervous/anxious.        Vitals:    06/15/22 1447   BP: 114/76   Pulse: 68   SpO2: 99%   Weight: 74.3 kg (163 lb 12.8 oz)   Height: 172.7 cm (67.99\")     Body mass index is 24.91 kg/m².      Current Outpatient Medications:   •  levothyroxine (SYNTHROID, LEVOTHROID) 25 MCG tablet, Take 1 tablet by mouth Daily. Indications: Underactive Thyroid, Disp: 30 tablet, Rfl: 9  •  metoprolol succinate XL (TOPROL-XL) 25 MG 24 hr tablet, Take 1 tablet by mouth Daily., Disp: 30 tablet, Rfl: 9  •  mirtazapine (REMERON) 30 MG tablet, Take 1 tablet by mouth Every Night., Disp: 30 tablet, Rfl: 3  •  QUEtiapine (SEROquel) 200 MG tablet, Take 1 tablet by mouth Every Night. Indications: Depression unspecified, Disp: 30 tablet, Rfl: 9  •  buPROPion XL (Wellbutrin XL) 300 MG 24 hr tablet, Take 1 tablet by mouth Daily., Disp: 30 tablet, Rfl: 9  •  QUEtiapine (SEROquel) 50 MG tablet, Take 1 tablet by mouth Every Night., Disp: 30 tablet, Rfl: 9    Physical Exam:    Physical Exam  Vitals and nursing note reviewed.   Constitutional:       " General: She is not in acute distress.     Appearance: She is well-developed. She is not diaphoretic.   HENT:      Head: Normocephalic and atraumatic.      Right Ear: External ear normal.      Left Ear: External ear normal.      Mouth/Throat:      Pharynx: No oropharyngeal exudate.   Eyes:      General: No scleral icterus.        Right eye: No discharge.      Conjunctiva/sclera: Conjunctivae normal.   Neck:      Thyroid: No thyromegaly.      Vascular: No JVD.      Trachea: No tracheal deviation.   Cardiovascular:      Rate and Rhythm: Normal rate and regular rhythm.      Heart sounds: Normal heart sounds.      Comments: PMI nondisplaced  Pulmonary:      Effort: Pulmonary effort is normal.      Breath sounds: Normal breath sounds. No wheezing or rales.   Abdominal:      General: Bowel sounds are normal.      Palpations: Abdomen is soft.      Tenderness: There is no abdominal tenderness. There is no guarding or rebound.   Musculoskeletal:      Cervical back: Normal range of motion and neck supple.      Comments: Normal gait   Lymphadenopathy:      Cervical: No cervical adenopathy.   Skin:     General: Skin is warm and dry.      Capillary Refill: Capillary refill takes less than 2 seconds.      Coloration: Skin is not pale.      Findings: No rash.   Neurological:      Mental Status: She is alert and oriented to person, place, and time.      Motor: No abnormal muscle tone.      Coordination: Coordination normal.   Psychiatric:         Mood and Affect: Mood normal.         Behavior: Behavior normal.         Judgment: Judgment normal.         Procedures    Results Review:    I reviewed the patient's new clinical results.    Assessment/Plan:     Problem List Items Addressed This Visit        Cardiac and Vasculature    Hypertension    Current Assessment & Plan     Hypertension is improving with treatment.  Continue current treatment regimen.  Dietary sodium restriction.  Weight loss.  Regular aerobic exercise.  I suspect a  lot of this may be alcohol related.  I am hoping we will be able to taper off metoprolol as the longer she is in sobriety.  Blood pressure will be reassessed at the next regular appointment.           Relevant Medications    metoprolol succinate XL (TOPROL-XL) 25 MG 24 hr tablet    Other Relevant Orders    CBC & Differential    Comprehensive Metabolic Panel       Endocrine and Metabolic    Hypothyroidism - Primary    Current Assessment & Plan     Fortunately she has been without her medication for approximately 4-week.  Recommended TSH and resume her current dose at present.  We need may need to make further adjustments.           Relevant Medications    levothyroxine (SYNTHROID, LEVOTHROID) 25 MCG tablet    metoprolol succinate XL (TOPROL-XL) 25 MG 24 hr tablet    Other Relevant Orders    TSH Rfx On Abnormal To Free T4       Mental Health    Alcohol use disorder, severe, dependence (HCC)    Current Assessment & Plan     She is currently sober living after doing multiple 30-day courses rehab.  She has been abstinent x35 days.  She seems to be working a 12-step program and is looking for a sponsor.           Depression    Current Assessment & Plan     Patient's depression is recurrent and is moderate without psychosis. Their depression is currently in partial remission and the condition is improving with treatment. This will be reassessed at the next regular appointment. F/U as described:patient will continue current medication therapy.  Have recommended and will increase Wellbutrin to 300 mg XL daily.  Continue Seroquel at bedtime and Remeron for sleep.           Relevant Medications    buPROPion XL (Wellbutrin XL) 300 MG 24 hr tablet    mirtazapine (REMERON) 30 MG tablet    QUEtiapine (SEROquel) 200 MG tablet    QUEtiapine (SEROquel) 50 MG tablet          Plan of care reviewed with patient at the conclusion of today's visit. Education was provided regarding diagnosis and management.  Patient verbalizes  understanding of and agreement with management plan.    Return in about 2 months (around 8/15/2022) for meds.    Westley Hill MD      Please note than portions of this note were completed wt a Voice Recognition Program

## 2022-08-12 RX ORDER — QUETIAPINE FUMARATE 50 MG/1
50 TABLET, FILM COATED ORAL NIGHTLY
Qty: 30 TABLET | Refills: 3 | Status: SHIPPED | OUTPATIENT
Start: 2022-08-12

## 2022-08-12 RX ORDER — MIRTAZAPINE 30 MG/1
30 TABLET, FILM COATED ORAL NIGHTLY
Qty: 30 TABLET | Refills: 3 | Status: SHIPPED | OUTPATIENT
Start: 2022-08-12

## 2022-08-12 RX ORDER — LEVOTHYROXINE SODIUM 0.03 MG/1
25 TABLET ORAL DAILY
Qty: 30 TABLET | Refills: 3 | Status: SHIPPED | OUTPATIENT
Start: 2022-08-12

## 2022-08-12 RX ORDER — QUETIAPINE FUMARATE 200 MG/1
200 TABLET, FILM COATED ORAL NIGHTLY
Qty: 30 TABLET | Refills: 3 | Status: SHIPPED | OUTPATIENT
Start: 2022-08-12

## 2022-08-12 RX ORDER — METOPROLOL SUCCINATE 25 MG/1
25 TABLET, EXTENDED RELEASE ORAL DAILY
Qty: 30 TABLET | Refills: 3 | Status: SHIPPED | OUTPATIENT
Start: 2022-08-12

## 2022-08-12 RX ORDER — BUPROPION HYDROCHLORIDE 300 MG/1
300 TABLET ORAL DAILY
Qty: 30 TABLET | Refills: 3 | Status: SHIPPED | OUTPATIENT
Start: 2022-08-12

## 2022-08-12 NOTE — TELEPHONE ENCOUNTER
Rx Refill Note  Requested Prescriptions     Pending Prescriptions Disp Refills   • buPROPion XL (Wellbutrin XL) 300 MG 24 hr tablet 30 tablet 3     Sig: Take 1 tablet by mouth Daily.   • levothyroxine (SYNTHROID, LEVOTHROID) 25 MCG tablet 30 tablet 3     Sig: Take 1 tablet by mouth Daily. Indications: Underactive Thyroid   • metoprolol succinate XL (TOPROL-XL) 25 MG 24 hr tablet 30 tablet 3     Sig: Take 1 tablet by mouth Daily.   • mirtazapine (REMERON) 30 MG tablet 30 tablet 3     Sig: Take 1 tablet by mouth Every Night.   • QUEtiapine (SEROquel) 200 MG tablet 30 tablet 3     Sig: Take 1 tablet by mouth Every Night. Indications: Depression unspecified   • QUEtiapine (SEROquel) 50 MG tablet 30 tablet 3     Sig: Take 1 tablet by mouth Every Night.      Last office visit with prescribing clinician: 6/15/2022      Next office visit with prescribing clinician: Visit date not found            Maddie Castorena MA  08/12/22, 14:19 EDT

## 2022-08-12 NOTE — TELEPHONE ENCOUNTER
Caller: Haroldo Bach    Relationship: Self    Best call back number: [phone]    Requested Prescriptions:   Requested Prescriptions     Pending Prescriptions Disp Refills   • buPROPion XL (Wellbutrin XL) 300 MG 24 hr tablet 30 tablet 9     Sig: Take 1 tablet by mouth Daily.   • levothyroxine (SYNTHROID, LEVOTHROID) 25 MCG tablet 30 tablet 9     Sig: Take 1 tablet by mouth Daily. Indications: Underactive Thyroid   • metoprolol succinate XL (TOPROL-XL) 25 MG 24 hr tablet 30 tablet 9     Sig: Take 1 tablet by mouth Daily.   • mirtazapine (REMERON) 30 MG tablet 30 tablet 3     Sig: Take 1 tablet by mouth Every Night.   • QUEtiapine (SEROquel) 200 MG tablet 30 tablet 9     Sig: Take 1 tablet by mouth Every Night. Indications: Depression unspecified   • QUEtiapine (SEROquel) 50 MG tablet 30 tablet 9     Sig: Take 1 tablet by mouth Every Night.        Pharmacy where request should be sent: [facility] [id number] [place] [address] - [phone]  - [phone] FX     Additional details provided by patient: THESE NEED TO BE SENT TO THE [facility] IN [place], [place].  PLEASE REMOVE THE MELVINR IN [place] FROM HER CHART.     Does the patient have less than a 3 day supply:  [x] Yes  [] No    Eli Davenport, PCT   08/12/22 13:48 EDT

## 2023-02-15 ENCOUNTER — OFFICE VISIT (OUTPATIENT)
Dept: UROLOGY | Facility: CLINIC | Age: 38
End: 2023-02-15
Payer: MEDICAID

## 2023-02-15 VITALS — OXYGEN SATURATION: 99 % | HEART RATE: 70 BPM | HEIGHT: 68 IN | WEIGHT: 163 LBS | BODY MASS INDEX: 24.71 KG/M2

## 2023-02-15 DIAGNOSIS — R39.15 URINARY URGENCY: Primary | ICD-10-CM

## 2023-02-15 DIAGNOSIS — R35.0 URINARY FREQUENCY: ICD-10-CM

## 2023-02-15 DIAGNOSIS — R39.14 FEELING OF INCOMPLETE BLADDER EMPTYING: ICD-10-CM

## 2023-02-15 LAB
BILIRUB BLD-MCNC: NEGATIVE MG/DL
CLARITY, POC: CLEAR
COLOR UR: YELLOW
EXPIRATION DATE: NORMAL
GLUCOSE UR STRIP-MCNC: NEGATIVE MG/DL
KETONES UR QL: NEGATIVE
LEUKOCYTE EST, POC: NEGATIVE
Lab: NORMAL
NITRITE UR-MCNC: NEGATIVE MG/ML
PH UR: 5.5 [PH] (ref 5–8)
PROT UR STRIP-MCNC: NEGATIVE MG/DL
RBC # UR STRIP: NEGATIVE /UL
SP GR UR: 1.03 (ref 1–1.03)
UROBILINOGEN UR QL: NORMAL

## 2023-02-15 PROCEDURE — 51798 US URINE CAPACITY MEASURE: CPT | Performed by: NURSE PRACTITIONER

## 2023-02-15 PROCEDURE — 99204 OFFICE O/P NEW MOD 45 MIN: CPT | Performed by: NURSE PRACTITIONER

## 2023-02-15 RX ORDER — SENNOSIDES 8.6 MG/1
TABLET, COATED ORAL
COMMUNITY
Start: 2023-01-16

## 2023-02-15 RX ORDER — OXYBUTYNIN CHLORIDE 5 MG/1
5 TABLET ORAL 3 TIMES DAILY
Qty: 90 TABLET | Refills: 1 | Status: SHIPPED | OUTPATIENT
Start: 2023-02-15 | End: 2023-02-15 | Stop reason: SDUPTHER

## 2023-02-15 RX ORDER — QUETIAPINE FUMARATE 200 MG/1
1 TABLET, FILM COATED ORAL NIGHTLY
COMMUNITY
Start: 2022-08-15

## 2023-02-15 RX ORDER — TAMSULOSIN HYDROCHLORIDE 0.4 MG/1
1 CAPSULE ORAL DAILY
Qty: 30 CAPSULE | Refills: 0 | Status: SHIPPED | OUTPATIENT
Start: 2023-02-15 | End: 2023-02-15 | Stop reason: SDUPTHER

## 2023-02-15 RX ORDER — OXYBUTYNIN CHLORIDE 5 MG/1
5 TABLET ORAL DAILY
Qty: 90 TABLET | Refills: 2 | Status: SHIPPED | OUTPATIENT
Start: 2023-02-15

## 2023-02-15 RX ORDER — TAMSULOSIN HYDROCHLORIDE 0.4 MG/1
1 CAPSULE ORAL DAILY
Qty: 30 CAPSULE | Refills: 0 | Status: SHIPPED | OUTPATIENT
Start: 2023-02-15

## 2023-02-15 RX ORDER — BUPROPION HYDROCHLORIDE 200 MG/1
TABLET, EXTENDED RELEASE ORAL
COMMUNITY
Start: 2023-01-03

## 2023-02-15 RX ORDER — NALTREXONE HYDROCHLORIDE 50 MG/1
TABLET, FILM COATED ORAL
COMMUNITY
Start: 2023-01-14

## 2023-02-15 RX ORDER — DOCUSATE SODIUM 100 MG/1
CAPSULE, LIQUID FILLED ORAL
COMMUNITY
Start: 2023-01-19

## 2023-02-15 RX ORDER — MULTIVITAMIN WITH FOLIC ACID 400 MCG
TABLET ORAL
COMMUNITY
Start: 2023-01-19

## 2023-02-15 NOTE — PROGRESS NOTES
[facility] Female Office Visit      Patient Name: Haroldo Bach  : 1985   MRN: [id number]     Chief Complaint:  Lower Urinary Tract Symptoms.   Chief Complaint   Patient presents with   • Urinary Urgency       Referring Provider: System, Provider Not In    History of Present Illness: Mr. Bach is a 37 y.o. female with history lower urinary tract symptoms. She has previously seen a Urologist in [place] several years ago for urinary urgency and frequency. Previous treatment at that time included PFPT, daily Macrobid and PTNS. She reports the PTNS did help improve her symptoms for a short time. She also underwent Cystoscopy, which she believes was normal.     She reports urinary urgency, weak urinary stream, feeling of incomplete bladder emptying and urinary frequency [id number]. She denies dysuria or hematuria. She has one cup of coffee per day. She reports nocturia x1.    Bladder scan PVR 16 cc  Subjective      Review of System: Review of Systems   Genitourinary: Positive for frequency and urgency. Negative for dysuria and hematuria.      I have reviewed the ROS documented by my clinical staff, I have updated appropriately and I agree. RICHARD Santiago    Past Medical History:  Past Medical History:   Diagnosis Date   • Alcohol abuse    • Anxiety    • Depression    • Hypertension    • Substance abuse (HCC)    • Withdrawal symptoms, alcohol (HCC)        Past Surgical History:  History reviewed. No pertinent surgical history.    Medications:    Current Outpatient Medications:   •  buPROPion SR (WELLBUTRIN SR) 200 MG 12 hr tablet, , Disp: , Rfl:   •  docusate sodium (COLACE) 100 MG capsule, , Disp: , Rfl:   •  levothyroxine (SYNTHROID, LEVOTHROID) 25 MCG tablet, Take 1 tablet by mouth Daily. Indications: Underactive Thyroid, Disp: 30 tablet, Rfl: 3  •  metoprolol succinate XL (TOPROL-XL) 25 MG 24 hr tablet, Take 1 tablet by mouth Daily., Disp: 30 tablet, Rfl: 3  •  mirtazapine (REMERON) 30 MG tablet, Take 1  "tablet by mouth Every Night., Disp: 30 tablet, Rfl: 3  •  multivitamin with minerals tablet tablet, , Disp: , Rfl:   •  naltrexone (DEPADE) 50 MG tablet, , Disp: , Rfl:   •  QUEtiapine (SEROquel) 200 MG tablet, Take 1 tablet by mouth Every Night. Indications: Depression unspecified, Disp: 30 tablet, Rfl: 3  •  QUEtiapine (SEROquel) 200 MG tablet, Take 1 tablet by mouth Every Night., Disp: , Rfl:   •  Senna-Time 8.6 MG tablet, , Disp: , Rfl:   •  buPROPion XL (Wellbutrin XL) 300 MG 24 hr tablet, Take 1 tablet by mouth Daily., Disp: 30 tablet, Rfl: 3  •  oxybutynin (DITROPAN) 5 MG tablet, Take 1 tablet by mouth Daily., Disp: 90 tablet, Rfl: 2  •  QUEtiapine (SEROquel) 50 MG tablet, Take 1 tablet by mouth Every Night., Disp: 30 tablet, Rfl: 3  •  tamsulosin (FLOMAX) 0.4 MG capsule 24 hr capsule, Take 1 capsule by mouth Daily., Disp: 30 capsule, Rfl: 0    Allergies:  No Known Allergies    Social History:  Social History     Socioeconomic History   • Marital status:    Tobacco Use   • Smoking status: Former     Packs/day: 0.25     Years: 17.00     Pack years: 4.25     Types: Cigarettes     Start date: 2004     Quit date: 2021     Years since quittin.1     Passive exposure: Never   • Smokeless tobacco: Never   Vaping Use   • Vaping Use: Every day   • Substances: Nicotine   Substance and Sexual Activity   • Alcohol use: Not Currently     Comment: a fifth of vodka a day    • Drug use: Yes     Comment: ETOH, sober x 35 days   • Sexual activity: Yes     Partners: Male       Family History:  Family History   Problem Relation Age of Onset   • Hypertension Father    • ADD / ADHD Sister    • Anxiety disorder Sister    • Drug abuse Sister    • Anxiety disorder Maternal Grandfather        Post void residual bladder scan:    16mL    Objective     Physical Exam:   Vital Signs:   Vitals:    02/15/23 1517   Pulse: 70   SpO2: 99%   Weight: 73.9 kg (163 lb)   Height: 172.7 cm (67.99\")   PainSc: 0-No pain     Body mass " index is 24.79 kg/m².     Physical Exam  Vitals and nursing note reviewed.   Constitutional:       Appearance: Normal appearance.   HENT:      Head: Normocephalic and atraumatic.      Nose: Nose normal.      Mouth/Throat:      Mouth: Mucous membranes are moist.   Eyes:      Pupils: Pupils are equal, round, and reactive to light.   Pulmonary:      Effort: Pulmonary effort is normal.   Abdominal:      General: Abdomen is flat.      Palpations: Abdomen is soft.   Musculoskeletal:         General: Normal range of motion.      Cervical back: Normal range of motion.   Skin:     General: Skin is warm and dry.      Capillary Refill: Capillary refill takes less than 2 seconds.   Neurological:      General: No focal deficit present.      Mental Status: She is alert.   Psychiatric:         Mood and Affect: Mood normal.         Labs:   Brief Urine Lab Results  (Last result in the past 365 days)      Color   Clarity   Blood   Leuk Est   Nitrite   Protein   CREAT   Urine HCG        02/15/23 1527 Yellow   Clear   Negative   Negative   Negative   Negative                      Lab Results   Component Value Date    GLUCOSE 87 07/06/2021    CALCIUM 9.1 07/06/2021     07/06/2021    K 4.2 07/06/2021    CO2 20.5 (L) 07/06/2021    CL 96 (L) 07/06/2021    BUN 12 07/06/2021    CREATININE 0.92 07/06/2021    EGFRIFNONA 70 07/06/2021    BCR 13.0 07/06/2021    ANIONGAP 23.5 (H) 07/06/2021       Lab Results   Component Value Date    WBC 8.6 08/19/2022    HGB 12.7 08/19/2022    HCT 38.7 08/19/2022    MCV 76.2 (L) 08/19/2022     08/19/2022       Images:   No Images in the past 120 days found..    Measures:   Tobacco:   Haroldo Bach  reports that she quit smoking about 2 years ago. Her smoking use included cigarettes. She started smoking about 19 years ago. She has a 4.25 pack-year smoking history. She has never been exposed to tobacco smoke. She has never used smokeless tobacco..       Urine Incontinence: Patient reports that she  is not currently experiencing any symptoms of urinary incontinence.    Assessment / Plan      Assessment:  Mrs. Bach is a 37 y.o. female who presented today with urinary urgency and frequency. We will have her trial one month of Flomax to assess improvement in strength of stream/feeling of incomplete bladder emptying. I will also have her begin Oxybutynin 5mg once daily for OAB.     We discussed the importance of timed voiding and double voiding. She is agreeable. She will also complete a bladder diary x3 days. I encouraged her to discontinue fluid intake 2-3 hours before bedtime for her nocturia.     We also discussed restarting PFPT. She would like to hold off at this time.     Diagnoses and all orders for this visit:    1. Urinary urgency (Primary)  -     POC Urinalysis Dipstick, Automated  -     Discontinue: oxybutynin (DITROPAN) 5 MG tablet; Take 1 tablet by mouth 3 (Three) Times a Day for 90 days.  Dispense: 90 tablet; Refill: 1  -     oxybutynin (DITROPAN) 5 MG tablet; Take 1 tablet by mouth Daily.  Dispense: 90 tablet; Refill: 2    2. Urinary frequency    3. Feeling of incomplete bladder emptying  -     Discontinue: tamsulosin (FLOMAX) 0.4 MG capsule 24 hr capsule; Take 1 capsule by mouth Daily.  Dispense: 30 capsule; Refill: 0  -     tamsulosin (FLOMAX) 0.4 MG capsule 24 hr capsule; Take 1 capsule by mouth Daily.  Dispense: 30 capsule; Refill: 0       Follow Up:   Return in about 6 weeks (around 3/29/2023) for Karen Ott.    I spent approximately 40 minutes providing clinical care for this patient; including review of patient's chart and provider documentation, face to face time spent with patient in examination room (obtaining history, performing physical exam, discussing diagnosis and management options), placing orders, and completing patient documentation.     RICHARD Santiago  [facility]